# Patient Record
Sex: MALE | Race: BLACK OR AFRICAN AMERICAN | NOT HISPANIC OR LATINO | URBAN - METROPOLITAN AREA
[De-identification: names, ages, dates, MRNs, and addresses within clinical notes are randomized per-mention and may not be internally consistent; named-entity substitution may affect disease eponyms.]

---

## 2024-09-18 ENCOUNTER — PATIENT MESSAGE (OUTPATIENT)
Dept: UROLOGY | Facility: CLINIC | Age: 49
End: 2024-09-18
Payer: COMMERCIAL

## 2024-09-18 ENCOUNTER — TELEPHONE (OUTPATIENT)
Dept: UROLOGY | Facility: CLINIC | Age: 49
End: 2024-09-18
Payer: COMMERCIAL

## 2024-09-26 ENCOUNTER — PATIENT MESSAGE (OUTPATIENT)
Dept: UROLOGY | Facility: CLINIC | Age: 49
End: 2024-09-26
Payer: COMMERCIAL

## 2024-10-20 NOTE — PROGRESS NOTES
PATIENT: Tim Donohue .  MRN: [id number]  DATE: 10/24/2024    Diagnosis:   1. Elevated ferritin    2. Folate deficiency    3. Elevated PSA    4. Advance care planning        Chief Complaint: elevated ferritin      Oncologic History:      Oncologic History     Oncologic Treatment     Pathology       Subjective:    History of Present Illness: Mr. Donohue is a 49 y.o. male who presents for evaluation and management of elevated ferritin, decreased folate, and elevated PSA. He is referred by Dr. Godinez.    - ferritin (1/23/23, 6/10/24) was elevated  - folate (1/23/23, 6/10/24) was decreased   - PSA (6/10/24) was mildly elevated at 4.3 ng/mL    - today, he is doing well. He endorses fatigue. He denies shortness of breath, chest pain, nausea, vomiting, diarrhea, constipation.  - he states he is drinking less alcohol  - he saw hepatology today.        Past medical, surgical, family, and social histories have been reviewed and updated below.    Past Medical History: No past medical history on file.    Past Surgical History:   Past Surgical History:   Procedure Laterality Date    knee scope  1999       Family History:   Family History   Problem Relation Name Age of Onset    Cancer Mother      Hypertension Mother      Diabetes Mother      Thyroid cancer Mother      Heart disease Father      Hyperlipidemia Father      Hypertension Father      Vision loss Father          2/2 to pituitary microadenoma    Hypertension Brother         Social History:  reports that he has been smoking cigars. He has never used smokeless tobacco. He reports current alcohol use. He reports that he does not use drugs.    Allergies:  Review of patient's allergies indicates:  No Known Allergies    Medications:  Current Outpatient Medications   Medication Sig Dispense Refill    cyanocobalamin (VITAMIN B-12) 1000 MCG tablet Take 1 tablet (1,000 mcg total) by mouth once daily. 90 tablet 5    folic acid (FOLVITE) 1 MG tablet Take 1 tablet (1 mg total)  "by mouth once daily. 90 tablet 1    sertraline (ZOLOFT) 25 MG tablet Take 1 tablet (25 mg total) by mouth once daily. 90 tablet 3     No current facility-administered medications for this visit.       Review of Systems   Constitutional:  Positive for fatigue.   HENT:  Negative for sore throat.    Eyes:  Negative for visual disturbance.   Respiratory:  Negative for shortness of breath.    Cardiovascular:  Negative for chest pain.   Gastrointestinal:  Negative for abdominal pain.   Genitourinary:  Negative for dysuria.   Musculoskeletal:  Negative for back pain.   Skin:  Negative for rash.   Neurological:  Negative for headaches.   Hematological:  Negative for adenopathy.   Psychiatric/Behavioral:  The patient is not nervous/anxious.        ECOG Performance Status:   ECOG SCORE    0 - Fully active-able to carry on all pre-disease performance without restriction         Objective:      Vitals:   Vitals:    10/24/24 1309   BP: 135/89   BP Location: Left arm   Patient Position: Sitting   Pulse: 76   Resp: 20   Temp: 98.4 °F (36.9 °C)   TempSrc: Oral   SpO2: 98%   Weight: 77.3 kg (170 lb 6.7 oz)   Height: 6' 2" (1.88 m)     BMI: Body mass index is 21.88 kg/m².    Physical Exam  Vitals and nursing note reviewed.   Constitutional:       Appearance: He is well-developed.   HENT:      Head: Normocephalic and atraumatic.   Eyes:      Pupils: Pupils are equal, round, and reactive to light.   Cardiovascular:      Rate and Rhythm: Normal rate and regular rhythm.   Pulmonary:      Effort: Pulmonary effort is normal.      Breath sounds: Normal breath sounds.   Abdominal:      General: Bowel sounds are normal.      Palpations: Abdomen is soft.   Musculoskeletal:         General: Normal range of motion.      Cervical back: Normal range of motion and neck supple.   Skin:     General: Skin is warm and dry.   Neurological:      Mental Status: He is alert and oriented to person, place, and time.   Psychiatric:         Behavior: Behavior " normal.         Thought Content: Thought content normal.         Judgment: Judgment normal.         Laboratory Data:  Labs have been reviewed.    Lab Results   Component Value Date    WBC 3.60 (L) 09/19/2024    HGB 14.1 09/19/2024    HCT 40.3 09/19/2024    MCV 95 09/19/2024     09/19/2024           Imaging:    Assessment:       1. Elevated ferritin    2. Folate deficiency    3. Elevated PSA    4. Advance care planning           Plan:     Elevated ferritin  - I have reviewed his chart  - ferritin (1/23/23, 6/10/24) was elevated  - transferrin saturation is 15.66%, so hemochromatosis is less likely  - he drinks alcohol. I suspect his elevated ferritin is a combination of his fatty liver combined with alcohol use.  - follow up hemochromatosis panel, which was drawn today. Ferritin today has decreased to 409 ng/mL.  - I will contact him with results of testing. If needed, I will schedule a follow-up appointment.    2. Folate deficiency  - folate (1/23/23, 6/10/24) was decreased   - I sent in new prescription for folic acid daily.    3. Elevated PSA  - PSA (6/10/24) was mildly elevated at 4.3 ng/mL  - he is scheduled to see urology in the upcoming weeks. Defer management to urology.    4. Advance Care Planning     Date: 10/24/2024    Power of   I initiated the process of voluntary advance care planning today and explained the importance of this process to the patient.  I introduced the concept of advance directives to the patient, as well. Then the patient received detailed information about the importance of designating a Health Care Power of  (HCPOA). He was also instructed to communicate with this person about their wishes for future healthcare, should he become sick and lose decision-making capacity. The patient has not previously appointed a HCPOA. After our discussion, the patient has decided to complete a HCPOA and has appointed his  mother Ann Marie Donohue ([phone]) . I encouraged him to  communicate with this person about their wishes for future healthcare, should he become sick and lose decision-making capacity.      A total of 16 min was spent on advance care planning, goals of care discussion, emotional support, formulating and communicating prognosis and exploring burden/benefit of various approaches of treatment. This discussion occurred on a fully voluntary basis with the verbal consent of the patient and/or family.     - I will contact him with results of testing. If needed, I will schedule a follow-up appointment.    Raghu Trevino M.D.  Hematology/Oncology  [facility] - [address], [address]  [place] [address]  Phone: [phone]  Fax: [phone]

## 2024-10-21 ENCOUNTER — PATIENT MESSAGE (OUTPATIENT)
Dept: UROLOGY | Facility: CLINIC | Age: 49
End: 2024-10-21
Payer: COMMERCIAL

## 2024-10-24 ENCOUNTER — LAB VISIT (OUTPATIENT)
Dept: LAB | Facility: HOSPITAL | Age: 49
End: 2024-10-24
Payer: COMMERCIAL

## 2024-10-24 ENCOUNTER — OFFICE VISIT (OUTPATIENT)
Dept: HEPATOLOGY | Facility: CLINIC | Age: 49
End: 2024-10-24
Payer: COMMERCIAL

## 2024-10-24 ENCOUNTER — OFFICE VISIT (OUTPATIENT)
Dept: HEMATOLOGY/ONCOLOGY | Facility: CLINIC | Age: 49
End: 2024-10-24
Payer: COMMERCIAL

## 2024-10-24 VITALS
HEART RATE: 76 BPM | WEIGHT: 170.44 LBS | SYSTOLIC BLOOD PRESSURE: 135 MMHG | BODY MASS INDEX: 21.87 KG/M2 | RESPIRATION RATE: 20 BRPM | HEIGHT: 74 IN | DIASTOLIC BLOOD PRESSURE: 89 MMHG | TEMPERATURE: 98 F | OXYGEN SATURATION: 98 %

## 2024-10-24 VITALS — WEIGHT: 171.06 LBS | BODY MASS INDEX: 21.95 KG/M2 | HEIGHT: 74 IN

## 2024-10-24 DIAGNOSIS — K76.0 HEPATIC STEATOSIS: Primary | ICD-10-CM

## 2024-10-24 DIAGNOSIS — Z78.9 ALCOHOL USE: ICD-10-CM

## 2024-10-24 DIAGNOSIS — R79.89 ELEVATED LIVER FUNCTION TESTS: ICD-10-CM

## 2024-10-24 DIAGNOSIS — E53.8 FOLATE DEFICIENCY: ICD-10-CM

## 2024-10-24 DIAGNOSIS — R97.20 ELEVATED PSA: ICD-10-CM

## 2024-10-24 DIAGNOSIS — R79.89 ELEVATED FERRITIN: Primary | ICD-10-CM

## 2024-10-24 DIAGNOSIS — Z71.89 ADVANCE CARE PLANNING: ICD-10-CM

## 2024-10-24 DIAGNOSIS — R79.89 ELEVATED FERRITIN: ICD-10-CM

## 2024-10-24 DIAGNOSIS — K76.0 HEPATIC STEATOSIS: ICD-10-CM

## 2024-10-24 PROBLEM — F10.90 ALCOHOL USE: Status: ACTIVE | Noted: 2024-10-24

## 2024-10-24 LAB
ALBUMIN SERPL BCP-MCNC: 4.1 G/DL (ref 3.5–5.2)
ALP SERPL-CCNC: 58 U/L (ref 40–150)
ALT SERPL W/O P-5'-P-CCNC: 20 U/L (ref 10–44)
AST SERPL-CCNC: 26 U/L (ref 10–40)
BILIRUB DIRECT SERPL-MCNC: 0.2 MG/DL (ref 0.1–0.3)
BILIRUB SERPL-MCNC: 0.4 MG/DL (ref 0.1–1)
FERRITIN SERPL-MCNC: 409 NG/ML (ref 20–300)
HAV IGM SERPL QL IA: NORMAL
HBV CORE IGM SERPL QL IA: NORMAL
HBV SURFACE AG SERPL QL IA: NORMAL
HCV AB SERPL QL IA: NORMAL
IRON SERPL-MCNC: 132 UG/DL (ref 45–160)
PROT SERPL-MCNC: 7.5 G/DL (ref 6–8.4)
SATURATED IRON: 34 % (ref 20–50)
TOTAL IRON BINDING CAPACITY: 385 UG/DL (ref 250–450)
TRANSFERRIN SERPL-MCNC: 260 MG/DL (ref 200–375)

## 2024-10-24 PROCEDURE — 3079F DIAST BP 80-89 MM HG: CPT | Mod: CPTII,S$GLB,, | Performed by: INTERNAL MEDICINE

## 2024-10-24 PROCEDURE — 3044F HG A1C LEVEL LT 7.0%: CPT | Mod: CPTII,S$GLB,,

## 2024-10-24 PROCEDURE — 99999 PR PBB SHADOW E&M-EST. PATIENT-LVL III: CPT | Mod: PBBFAC,,,

## 2024-10-24 PROCEDURE — 1159F MED LIST DOCD IN RCRD: CPT | Mod: CPTII,S$GLB,, | Performed by: INTERNAL MEDICINE

## 2024-10-24 PROCEDURE — 3044F HG A1C LEVEL LT 7.0%: CPT | Mod: CPTII,S$GLB,, | Performed by: INTERNAL MEDICINE

## 2024-10-24 PROCEDURE — 99999 PR PBB SHADOW E&M-EST. PATIENT-LVL IV: CPT | Mod: PBBFAC,,, | Performed by: INTERNAL MEDICINE

## 2024-10-24 PROCEDURE — 80321 ALCOHOLS BIOMARKERS 1OR 2: CPT

## 2024-10-24 PROCEDURE — 3008F BODY MASS INDEX DOCD: CPT | Mod: CPTII,S$GLB,, | Performed by: INTERNAL MEDICINE

## 2024-10-24 PROCEDURE — 84466 ASSAY OF TRANSFERRIN: CPT

## 2024-10-24 PROCEDURE — 1159F MED LIST DOCD IN RCRD: CPT | Mod: CPTII,S$GLB,,

## 2024-10-24 PROCEDURE — 1160F RVW MEDS BY RX/DR IN RCRD: CPT | Mod: CPTII,S$GLB,, | Performed by: INTERNAL MEDICINE

## 2024-10-24 PROCEDURE — 3008F BODY MASS INDEX DOCD: CPT | Mod: CPTII,S$GLB,,

## 2024-10-24 PROCEDURE — 1160F RVW MEDS BY RX/DR IN RCRD: CPT | Mod: CPTII,S$GLB,,

## 2024-10-24 PROCEDURE — 80074 ACUTE HEPATITIS PANEL: CPT

## 2024-10-24 PROCEDURE — 81256 HFE GENE: CPT

## 2024-10-24 PROCEDURE — 3075F SYST BP GE 130 - 139MM HG: CPT | Mod: CPTII,S$GLB,, | Performed by: INTERNAL MEDICINE

## 2024-10-24 PROCEDURE — 99497 ADVNCD CARE PLAN 30 MIN: CPT | Mod: S$GLB,,, | Performed by: INTERNAL MEDICINE

## 2024-10-24 PROCEDURE — 82728 ASSAY OF FERRITIN: CPT

## 2024-10-24 PROCEDURE — 80076 HEPATIC FUNCTION PANEL: CPT

## 2024-10-24 PROCEDURE — 99204 OFFICE O/P NEW MOD 45 MIN: CPT | Mod: S$GLB,,,

## 2024-10-24 PROCEDURE — 99204 OFFICE O/P NEW MOD 45 MIN: CPT | Mod: S$GLB,,, | Performed by: INTERNAL MEDICINE

## 2024-10-24 PROCEDURE — 36415 COLL VENOUS BLD VENIPUNCTURE: CPT

## 2024-10-24 RX ORDER — FOLIC ACID 1 MG/1
1 TABLET ORAL DAILY
Qty: 90 TABLET | Refills: 3 | Status: SHIPPED | OUTPATIENT
Start: 2024-10-24 | End: 2025-10-24

## 2024-10-24 NOTE — PROGRESS NOTES
[facility] - New Patient    REFERRING PROVIDER:  Dr. Sukumar Godinez  PCP: Sukumar Godinez DO     Chief Complaint:  fatty liver     HISTORY       HPI: This is a 49 y.o. patient with PMH noted below, presenting for evaluation of  fatty liver disease     No previous serologic w/u.      Interval HPI: Presents today alone. Reports that he is not sure why he was referred, but his PCP wanted him to follow up with several doctors so he is doing that this week. Denies and history of liver disease or being told in the past that he had fatty liver. Does drink alcohol in excess. Takes a shot to go to sleep at night and on the weekend he likes to drink margaritas. Reports that he is a  and works long hours and does not sleep well. Does not eat that much, especially when he does drink. States that he lives an active life.        LABS & DIAGNOSTIC STUDIES     Labs done 9/2024 show normal transaminase levels (intermittent elevation since 1/2023)  Platelets wnl, alk phos wnl  Synthetic liver functioning wnl    Lab Results   Component Value Date    ALT 24 09/19/2024    AST 40 09/19/2024    ALKPHOS 42 (L) 09/19/2024    BILITOT 0.7 09/19/2024    ALBUMIN 4.2 09/19/2024     09/19/2024       Imaging:  Abd U/S done 6/2024 noted  FINDINGS:  Liver: Normal in size, measuring 13.3 cm in length.  The liver is diffusely increased in echogenicity consistent with fatty changes of the liver.  There are multiple left lobe hepatic cysts present measuring up to 1.6 cm in size.  Right lobe hepatic cysts are also present measuring up to 2.4 cm in size.     Gallbladder: No calculi, wall thickening, or pericholecystic fluid.  No sonographic Wood's sign.     Biliary system: The common duct is not dilated, measuring 2 mm.  No intrahepatic ductal dilatation.     Spleen: Normal in size with a homogeneous echotexture, measuring 9.5 x 2.6 cm.     Pancreas: The visualized portions of pancreas appear normal.    "  Miscellaneous: No ascites.     Impression:     Multiple hepatic cysts.     Diffusely increased hepatic echogenicity suggestive of fatty changes of the liver.      Liver fibrosis staging:  -- fibroscan - will obtain       Excessive alcohol consumption, see below  Social History     Substance and Sexual Activity   Alcohol Use Yes    Comment: daily shot at night, on weekends about 8 servings       Immunity to Hep A and B - will check with next labs     Denies family history of liver disease.         Allergy and medication list reviewed and updated     PMHX:  has no past medical history on file.    PSHX:  has a past surgical history that includes knee scope (1999).    FAMILY HISTORY: Updated and reviewed in EPIC    ROS:   GENERAL: Denies fatigue  CARDIOVASCULAR: Denies edema  GI: Denies abdominal pain  SKIN: Denies rash, itching   NEURO: Denies confusion, memory loss, or mood changes    PHYSICAL EXAM:   In no acute distress; alert and oriented to person, place and time  VITALS: Ht 6' 2" (1.88 m)   Wt 77.6 kg (171 lb 1.2 oz)   BMI 21.96 kg/m²   EYES: Sclerae anicteric  GI: Soft, non-tender, non-distended. No ascites.  EXTREMITIES:  No edema.  SKIN: Warm and dry. No jaundice. No telangectasias noted. No palmar erythema.  NEURO:  No asterixis.  PSYCH:  Thought and speech pattern appropriate. Behavior normal          ASSESSMENT & PLAN        EDUCATION:  See instructions discussed with patient in Instructions section of the After Visit Summary     ASSESSMENT & PLAN:  49 y.o. male with:  1.  Fatty liver, likely related to alcohol use  - see HPI  -- Immunity to Hep A and B : see HPI  -- US 6/2024 noted hepatic steatosis  -- Fibroscan with RTC  -- Recommendations discussed with patient:  Recommend stopping alcohol   2 Maintain healthy weight  3. Low carb/sugar, high fiber and protein diet, limit your carb intake to LESS than 30-45 grams of carbs with a meal or LESS than 5-10 grams with any snack   4. Exercise, 5 days per " week, 30 minutes per day, as tolerated  5. Recommend good cholesterol, blood pressure, blood sugar levels   6. There is a new medication called Rezdiffra for the treatment of F2-F3 liver scarring due to fatty liver. It is only indicated for those with stage 2 or 3 scar tissue and no alcohol use     2. Elevated liver enzymes  -- most recent labs wnl, but upper limit of normal  -- likely due to alcohol use     3. Elevated iron/ferritin  -- can be related to fatty liver or excessive alcohol use  -- will recheck labs today as well as HH DNA    4.  Alcohol use   -- recommend stopping alcohol use  -- pt declines referral to addiction medicine currently - would like to try to stop on his own  Social History     Substance and Sexual Activity   Alcohol Use Yes    Comment: daily shot at night, on weekends about 8 servings             Follow up in about 3 months (around 1/24/2025). with labs and fibroscan before  Orders Placed This Encounter   Procedures    FibroScan Transplant Hepatology(Vibration Controlled Transient Elastography)    Hemochromatosis DNA Analysis (PCR)    Ferritin    Hepatic Function Panel    Iron and TIBC    Hepatitis Panel, Acute    Phosphatidylethanol (PETH)        Thank you for allowing me to participate in the care of Tim Donohue Sr.    RAYNA Fermin, FNP-C  Nurse Practitioner  [facility]     I spent a total of 45 minutes on the day of the visit.This includes face to face time and non-face to face time preparing to see the patient (eg, review of tests), obtaining and/or reviewing separately obtained history, documenting clinical information in the electronic or other health record, independently interpreting results and communicating results to the patient/family/caregiver, and coordinating care.         CC'ed note to:   Sukumar Godinez DO

## 2024-10-24 NOTE — PATIENT INSTRUCTIONS
1. Fibroscan to look for fat or scar tissue in the liver with return to clinic   2. Follow up in 3 months with labs and fibroscan before      FATTY LIVER:  These things are important to improve fatty liver:  Recommend stopping alcohol  Maintain a healthy weight  Avoid processed foods. No fried/fast foods. No sugary drinks or drinks with any calories.    Low carb/sugar and high protein diet (110 grams per day of protein).Try to limit your carb intake to LESS than  grams per day total. Use MyFitness Pal or Lose It skylar to add up your carbs through the day. Do NOT drink any beverages with calories or carbs (this can lead to high blood sugar and weight gain). The main thing to focus on is high protein, low carb)  Exercise, 5 days per week, 30 minutes per day, as tolerated  Recommend good cholesterol, blood pressure, blood sugar levels   There is a new medication called Rezdiffra for the treatment of F2-F3 liver scarring due to fatty liver. It is only indicated for patients with significant scar tissue from fatty liver and no alcohol use (will reassess after fibroscan)    In some people, fatty liver can progress to steatohepatitis (inflamatory fatty liver) and possibly to cirrhosis, putting one at increased risk for liver cancer, liver failure, and death. Therefore, the lifestyle changes are very important to decrease this risk.     Helpful website for [place]/fatty liver: [email or web address]/patient-[email or web address]

## 2024-10-26 LAB
CLINICAL BIOCHEMIST REVIEW: NORMAL
PLPETH BLD-MCNC: 457 NG/ML
POPETH BLD-MCNC: 655 NG/ML

## 2024-10-27 LAB
GENETICIST REVIEW: NORMAL
HFE GENE MUT ANL BLD/T: NORMAL
HFE RELEASED BY: NORMAL
HFE RESULT SUMMARY: NEGATIVE
REF LAB TEST METHOD: NORMAL
SPECIMEN SOURCE: NORMAL
SPECIMEN,  HEMOCHROMATOSIS: NORMAL

## 2024-10-28 ENCOUNTER — TELEPHONE (OUTPATIENT)
Dept: HEPATOLOGY | Facility: CLINIC | Age: 49
End: 2024-10-28
Payer: COMMERCIAL

## 2024-10-28 ENCOUNTER — OFFICE VISIT (OUTPATIENT)
Dept: GASTROENTEROLOGY | Facility: CLINIC | Age: 49
End: 2024-10-28
Payer: COMMERCIAL

## 2024-10-28 VITALS — WEIGHT: 164.44 LBS | BODY MASS INDEX: 21.12 KG/M2

## 2024-10-28 DIAGNOSIS — Z12.11 SCREENING FOR COLON CANCER: Primary | ICD-10-CM

## 2024-10-28 DIAGNOSIS — K76.0 HEPATIC STEATOSIS: Primary | ICD-10-CM

## 2024-10-28 DIAGNOSIS — R63.4 WEIGHT LOSS: ICD-10-CM

## 2024-10-28 PROCEDURE — 1160F RVW MEDS BY RX/DR IN RCRD: CPT | Mod: CPTII,S$GLB,, | Performed by: NURSE PRACTITIONER

## 2024-10-28 PROCEDURE — 99204 OFFICE O/P NEW MOD 45 MIN: CPT | Mod: S$GLB,,, | Performed by: NURSE PRACTITIONER

## 2024-10-28 PROCEDURE — 3044F HG A1C LEVEL LT 7.0%: CPT | Mod: CPTII,S$GLB,, | Performed by: NURSE PRACTITIONER

## 2024-10-28 PROCEDURE — 99999 PR PBB SHADOW E&M-EST. PATIENT-LVL III: CPT | Mod: PBBFAC,,, | Performed by: NURSE PRACTITIONER

## 2024-10-28 PROCEDURE — 3008F BODY MASS INDEX DOCD: CPT | Mod: CPTII,S$GLB,, | Performed by: NURSE PRACTITIONER

## 2024-10-28 PROCEDURE — 1159F MED LIST DOCD IN RCRD: CPT | Mod: CPTII,S$GLB,, | Performed by: NURSE PRACTITIONER

## 2024-10-28 RX ORDER — SODIUM, POTASSIUM,MAG SULFATES 17.5-3.13G
1 SOLUTION, RECONSTITUTED, ORAL ORAL DAILY
Qty: 1 KIT | Refills: 0 | Status: SHIPPED | OUTPATIENT
Start: 2024-10-28

## 2024-10-31 ENCOUNTER — PATIENT MESSAGE (OUTPATIENT)
Dept: HEMATOLOGY/ONCOLOGY | Facility: CLINIC | Age: 49
End: 2024-10-31
Payer: COMMERCIAL

## 2024-11-01 ENCOUNTER — TELEPHONE (OUTPATIENT)
Dept: ENDOSCOPY | Facility: HOSPITAL | Age: 49
End: 2024-11-01
Payer: COMMERCIAL

## 2024-11-06 ENCOUNTER — TELEPHONE (OUTPATIENT)
Dept: ENDOSCOPY | Facility: HOSPITAL | Age: 49
End: 2024-11-06
Payer: COMMERCIAL

## 2024-11-06 NOTE — TELEPHONE ENCOUNTER
Attempted to contact patient, to confirm procedure appt 11/8/24. No answer, unable to leave voicemail as voicemail box is full

## 2024-11-08 ENCOUNTER — TELEPHONE (OUTPATIENT)
Dept: GASTROENTEROLOGY | Facility: CLINIC | Age: 49
End: 2024-11-08
Payer: COMMERCIAL

## 2024-11-08 NOTE — TELEPHONE ENCOUNTER
Pt VM is not set up unable to leave VM.     ----- Message from [employer] sent at 11/7/2024  5:23 PM CST -----  Type: COLONOSCOPY CANCELLATION//General Call Back         Name of Caller:pt  Would the patient rather a call back or a response via [facility]? call  Best Call Back Number:[phone]   Additional Information: Pt states an emergency has come up and he needs to cancel 11/08 colonoscopy. Please call pt with further info and assistance Thank you.

## 2025-08-04 ENCOUNTER — OFFICE VISIT (OUTPATIENT)
Dept: FAMILY MEDICINE | Facility: CLINIC | Age: 50
End: 2025-08-04
Payer: COMMERCIAL

## 2025-08-04 ENCOUNTER — PATIENT OUTREACH (OUTPATIENT)
Dept: ADMINISTRATIVE | Facility: OTHER | Age: 50
End: 2025-08-04
Payer: COMMERCIAL

## 2025-08-04 VITALS
HEART RATE: 87 BPM | SYSTOLIC BLOOD PRESSURE: 130 MMHG | HEIGHT: 74 IN | DIASTOLIC BLOOD PRESSURE: 80 MMHG | WEIGHT: 165.38 LBS | BODY MASS INDEX: 21.23 KG/M2 | OXYGEN SATURATION: 98 %

## 2025-08-04 DIAGNOSIS — T14.8XXA MUSCLE STRAIN: Primary | ICD-10-CM

## 2025-08-04 PROCEDURE — 3075F SYST BP GE 130 - 139MM HG: CPT | Mod: CPTII,S$GLB,,

## 2025-08-04 PROCEDURE — 1160F RVW MEDS BY RX/DR IN RCRD: CPT | Mod: CPTII,S$GLB,,

## 2025-08-04 PROCEDURE — 3079F DIAST BP 80-89 MM HG: CPT | Mod: CPTII,S$GLB,,

## 2025-08-04 PROCEDURE — 3008F BODY MASS INDEX DOCD: CPT | Mod: CPTII,S$GLB,,

## 2025-08-04 PROCEDURE — 99999 PR PBB SHADOW E&M-EST. PATIENT-LVL III: CPT | Mod: PBBFAC,,,

## 2025-08-04 PROCEDURE — 99214 OFFICE O/P EST MOD 30 MIN: CPT | Mod: S$GLB,,,

## 2025-08-04 PROCEDURE — 1159F MED LIST DOCD IN RCRD: CPT | Mod: CPTII,S$GLB,,

## 2025-08-04 RX ORDER — CYCLOBENZAPRINE HYDROCHLORIDE 7.5 MG/1
7.5 TABLET, FILM COATED ORAL 3 TIMES DAILY PRN
Qty: 30 TABLET | Refills: 0 | Status: SHIPPED | OUTPATIENT
Start: 2025-08-04 | End: 2025-08-14

## 2025-08-04 NOTE — PROGRESS NOTES
[facility]    8/4/2025      Subjective:       Patient ID:  Santiago is a 50 y.o. male .  has no past medical history on file.    History of Present Illness    CHIEF COMPLAINT:  Mr. Donohue presents today for acute back pain after lifting a trailer hitch.    HISTORY OF PRESENT ILLNESS:  He reports back pain located on the side, currently rated at 6/10 severity. Currently, pain is improving with rest and he denies pain in other areas, including groin. He attributes the pain to lifting something incorrectly. He has not yet tried any treatments or medications for pain relief. Pain is gradually improving, with increased mobility since yesterday, and he was able to shower this morning.    SURGICAL HISTORY:  He reports a single prior surgical procedure of knee arthroscopy. He denies any history of hernias or other surgical interventions.      ROS:  General: -fever, -chills, -fatigue, -weight gain, -weight loss  Eyes: -vision changes, -redness, -discharge  ENT: -ear pain, -nasal congestion, -sore throat  Cardiovascular: -chest pain, -palpitations, -lower extremity edema  Respiratory: -cough, -shortness of breath  Gastrointestinal: -abdominal pain, -nausea, -vomiting, -diarrhea, -constipation, -blood in stool  Genitourinary: -dysuria, -hematuria, -frequency  Musculoskeletal: -joint pain, +muscle pain, +limited movement  Skin: -rash, -lesion  Neurological: -headache, -dizziness, -numbness, -tingling  Psychiatric: -anxiety, -depression, -sleep difficulty           Problem List[1]      Last HgbA1C:    Lab Results   Component Value Date    HGBA1C 5.1 06/10/2024    HGBA1C 5.1 01/23/2023         Last Lipid Panel:    Lab Results   Component Value Date    HDL 72 06/10/2024    HDL 85 (H) 01/23/2023    HDL 56 11/18/2016       Lab Results   Component Value Date    LDLCALC 93.6 06/10/2024    LDLCALC 90.6 01/23/2023    LDLCALC 136.6 11/18/2016       Lab Results   Component Value Date    TRIG 202 (H)  "06/10/2024    TRIG 132 01/23/2023    TRIG 67 11/18/2016       Lab Results   Component Value Date    CHOLHDL 35.0 06/10/2024    CHOLHDL 42.1 01/23/2023    CHOLHDL 27.2 11/18/2016         Review of patient's allergies indicates:  No Known Allergies     Medication List with Changes/Refills   New Medications    CYCLOBENZAPRINE (FEXMID) 7.5 MG TAB    Take 1 tablet (7.5 mg total) by mouth 3 (three) times daily as needed.   Current Medications    CYANOCOBALAMIN (VITAMIN B-12) 1000 MCG TABLET    Take 1 tablet (1,000 mcg total) by mouth once daily.    FOLIC ACID (FOLVITE) 1 MG TABLET    Take 1 tablet (1 mg total) by mouth once daily.   Discontinued Medications    SERTRALINE (ZOLOFT) 25 MG TABLET    Take 1 tablet (25 mg total) by mouth once daily.    SODIUM,POTASSIUM,MAG SULFATES (SUPREP BOWEL PREP KIT) 17.5-3.13-1.6 GRAM SOLR    Take 177 mLs by mouth once daily.               Objective:      /80 (BP Location: Right arm, Patient Position: Sitting)   Pulse 87   Ht 6' 2" (1.88 m)   Wt 75 kg (165 lb 5.5 oz)   SpO2 98%   BMI 21.23 kg/m²   Estimated body mass index is 21.23 kg/m² as calculated from the following:    Height as of this encounter: 6' 2" (1.88 m).    Weight as of this encounter: 75 kg (165 lb 5.5 oz).    Physical Exam  Vitals reviewed.   Constitutional:       General: He is not in acute distress.     Appearance: Normal appearance.   HENT:      Head: Normocephalic.   Eyes:      General: No scleral icterus.     Conjunctiva/sclera: Conjunctivae normal.   Cardiovascular:      Rate and Rhythm: Normal rate.   Pulmonary:      Effort: Pulmonary effort is normal. No respiratory distress.   Musculoskeletal:         General: Tenderness (Mild tenderness to right lower abdomen) present.   Skin:     Coloration: Skin is not pale.   Neurological:      Mental Status: He is oriented to person, place, and time. Mental status is at baseline.   Psychiatric:         Mood and Affect: Mood normal.         Behavior: Behavior " normal.             Assessment and Plan:   Assessment & Plan    PLAN SUMMARY:   Rest recommended for abdominal pain, likely due to muscle strain   No specific treatment ordered as hernia is less likely based on exam   Follow-up if pain worsens or new symptoms develop            1. Muscle strain  - cyclobenzaprine (FEXMID) 7.5 MG Tab; Take 1 tablet (7.5 mg total) by mouth 3 (three) times daily as needed.  Dispense: 30 tablet; Refill: 0    Assessed the patient's abdominal pain, likely due to muscle strain from lifting a trailer hitch.   Mr. Donohue reports pain in the back area, rated 6 out of 10, which was worse yesterday but has improved after resting.   No pain in groin or other areas.   Physical exam revealed no bulging or signs of hernia, making hernia less likely based on the location of the pain.  Follow up in about 2 weeks (around 8/18/2025) for w/ Juan Peguero PA-C for, f/u if symtoms do not resolve.    The patient was informed of the following statements     Emergency Care:Seek immediate medical attention in the emergency room if you experience any new or worsening symptoms, or if your current condition significantly changes or becomes more severe.  Patient Acknowledgment: Patient verbalizes understanding of the plan and agrees to proceed with the recommended care.      Follow Up:  Visit date not found   No future appointments.                    Other Orders Placed This Visit:  No orders of the defined types were placed in this encounter.        This note was generated with the assistance of ambient listening technology. Verbal consent was obtained by the patient and accompanying visitor(s) for the recording of patient appointment to facilitate this note. I attest to having reviewed and edited the generated note for accuracy, though some syntax or spelling errors may persist. Please contact the author of this note for any clarification.        David Peguero PA-C             [1]   Patient Active  Problem List  Diagnosis    BMI 22.0-22.9, adult    B12 deficiency    Elevated liver function tests    Folate deficiency    Situational anxiety    Elevated ferritin    Elevated PSA    Alcohol use    Hepatic steatosis